# Patient Record
Sex: FEMALE | Race: BLACK OR AFRICAN AMERICAN | NOT HISPANIC OR LATINO
[De-identification: names, ages, dates, MRNs, and addresses within clinical notes are randomized per-mention and may not be internally consistent; named-entity substitution may affect disease eponyms.]

---

## 2020-03-13 PROBLEM — Z00.00 ENCOUNTER FOR PREVENTIVE HEALTH EXAMINATION: Status: ACTIVE | Noted: 2020-03-13

## 2020-04-16 ENCOUNTER — APPOINTMENT (OUTPATIENT)
Dept: ORTHOPEDIC SURGERY | Facility: CLINIC | Age: 50
End: 2020-04-16

## 2020-07-29 ENCOUNTER — APPOINTMENT (OUTPATIENT)
Dept: ORTHOPEDIC SURGERY | Facility: CLINIC | Age: 50
End: 2020-07-29
Payer: COMMERCIAL

## 2020-07-29 VITALS — BODY MASS INDEX: 30.62 KG/M2 | WEIGHT: 202 LBS | HEIGHT: 68 IN

## 2020-07-29 VITALS — TEMPERATURE: 97.4 F

## 2020-07-29 DIAGNOSIS — Z87.39 PERSONAL HISTORY OF OTHER DISEASES OF THE MUSCULOSKELETAL SYSTEM AND CONNECTIVE TISSUE: ICD-10-CM

## 2020-07-29 PROCEDURE — 73562 X-RAY EXAM OF KNEE 3: CPT | Mod: 50

## 2020-07-29 PROCEDURE — 99203 OFFICE O/P NEW LOW 30 MIN: CPT | Mod: 25

## 2020-07-29 PROCEDURE — 20611 DRAIN/INJ JOINT/BURSA W/US: CPT | Mod: 50

## 2020-07-29 RX ORDER — DICLOFENAC SODIUM 75 MG/1
75 TABLET, DELAYED RELEASE ORAL TWICE DAILY
Qty: 60 | Refills: 4 | Status: ACTIVE | COMMUNITY
Start: 2020-07-29 | End: 1900-01-01

## 2020-07-29 NOTE — HISTORY OF PRESENT ILLNESS
[de-identified] : BILATERAL KNEE PAIN\par RIGHT WORSE THEN LEFT PAIN WISE, LEFT HAS LEFT ROM\par LEFT KNEE ARTHROSCOPY OVER 35 YEARS AGO\par PAIN LEVEL 5/10\par INTERMITTENT PAIN\par DULL AND ACHY\par BETTER WITH REST, MEDICATION- GEL INJECTIONS \par WORSE WITH BENDING, STAIRS, WALKING\par STIFFNESS\par SWELLING\par

## 2020-07-29 NOTE — PHYSICAL EXAM
[de-identified] : PHYSICAL EXAM BILATERAL KNEES\par \par AROM\par RIGHT  0- 120\par LEFT    0-110\par \par SPECIAL TESTS\par \par PATELLAR GRIND = GRIND \par DRAWER  = NEG\par LACHMAN = NEG\par MACMURRAY = NEG \par \par MOTOR = GROSSLY INTACT\par SENSORY = GROSSLY INTACT \par \par \par  [de-identified] : XRAY LEFT KNEE:\par 4 views of the knee\par SEVERE PF , MILD-MOD TF OA\par No obvious fracture or dislocation. \par Alignment within normal limits\par \par \par XRAY RIGHT KNEE:\par 4 views of the knee\par MOD PF , MILD-MOD TF OA\par No obvious fracture or dislocation. \par Alignment within normal limits\par \par \par

## 2020-07-29 NOTE — PROCEDURE
[de-identified] : INJECTION / ASPIRATION BILATERAL KNEES\par \par Patient has demonstrated limited relief from NSAIDS, rest, exercises / PT , and after discussion of the risks and benefits, the patient has elected to proceed with a\par n ULTRASOUND GUIDED corticosteroid injection into the BILATERAL SupeLat PF Knee\par  \par Confirmed that the patient does not have history of prior adverse reactions, active, infections, or relevant allergies. There was no effusion, erythema, or warmth, and the skin was clear\par \par The skin was sterilized with alcohol. Ethyl Chloride was used as a topical anesthetic. Routine sterile technique. \par  \par The KNEE JOINT  was injected utilizing ULTRASOUND GUIDANCEwith KENALOG + LIDOCAINE. The injection was completed without complication and a bandage was applied.\par \par The patient tolerated the procedure well and was given post-injection instructions.Rec: Cold therapy, analgesics, avoid heavy activity.\par \par MEDICATION: Lidocaine 1% 4cc + 10mg of Kenalog\par

## 2020-07-29 NOTE — DISCUSSION/SUMMARY
[de-identified] : POST INJECTION INSTRUCTIONS\par \par COLD THERAPY , ANALGESICS PRN\par \par HOME STRETCHING AND EXERCISES QD\par \par CONSIDER VISCOSUPPLEMENT \par

## 2021-03-12 ENCOUNTER — APPOINTMENT (OUTPATIENT)
Dept: ORTHOPEDIC SURGERY | Facility: CLINIC | Age: 51
End: 2021-03-12
Payer: MEDICAID

## 2021-03-12 PROCEDURE — 20610 DRAIN/INJ JOINT/BURSA W/O US: CPT | Mod: 50

## 2021-03-12 PROCEDURE — 99214 OFFICE O/P EST MOD 30 MIN: CPT | Mod: 25

## 2021-03-12 PROCEDURE — 99072 ADDL SUPL MATRL&STAF TM PHE: CPT

## 2021-03-12 RX ORDER — HYALURONATE SODIUM, STABILIZED 88 MG/4 ML
88 SYRINGE (ML) INTRAARTICULAR
Qty: 4 | Refills: 0 | Status: ACTIVE | OUTPATIENT
Start: 2021-03-12

## 2021-03-12 NOTE — PHYSICAL EXAM
[de-identified] : General: No acute distress, conversant, well-nourished.\par Head: Normocephalic, atraumatic\par Neck: trachea midline, FROM\par Heart: normotensive and normal rate and rhythm\par Lungs: No labored breathing\par Skin: No abrasions, no rashes, no edema\par Psych: Alert and oriented to person, place and time\par Extremities: no peripheral edema or digital cyanosis\par Gait: Normal gait. Can perform tandem gait.  \par Vascular: warm and well perfused distally, palpable distal pulses\par \par MSK\par Right Knee with no erythema, no effusion.  \par + tenderness to palpation of knee.\par \par Range of motion: 0 - 130 degrees\par + pain with range of motion.\par \par Negative Chester's test.\par Stable to varus and valgus stress.\par Negative Lachman's test, negative anterior and posterior drawer tests.  \par \par Sensation intact to light touch.  \par Normal motor exam.\par Warm and well perfused distally.\par \par Left Knee with no erythema, no effusion.  \par + tenderness to palpation of knee.\par \par Range of motion: 0 - 130 degrees\par + pain with range of motion.\par \par Negative Chester's test.\par Stable to varus and valgus stress.\par Negative Lachman's test, negative anterior and posterior drawer tests.  \par \par Sensation intact to light touch.  \par Normal motor exam.\par Warm and well perfused distally. [de-identified] : Bilateral knee xrays shows age related osteoarthritis.

## 2021-03-12 NOTE — PHYSICAL EXAM
[de-identified] : General: No acute distress, conversant, well-nourished.\par Head: Normocephalic, atraumatic\par Neck: trachea midline, FROM\par Heart: normotensive and normal rate and rhythm\par Lungs: No labored breathing\par Skin: No abrasions, no rashes, no edema\par Psych: Alert and oriented to person, place and time\par Extremities: no peripheral edema or digital cyanosis\par Gait: Normal gait. Can perform tandem gait.  \par Vascular: warm and well perfused distally, palpable distal pulses\par \par MSK\par Right Knee with no erythema, no effusion.  \par + tenderness to palpation of knee.\par \par Range of motion: 0 - 130 degrees\par + pain with range of motion.\par \par Negative Chester's test.\par Stable to varus and valgus stress.\par Negative Lachman's test, negative anterior and posterior drawer tests.  \par \par Sensation intact to light touch.  \par Normal motor exam.\par Warm and well perfused distally.\par \par Left Knee with no erythema, no effusion.  \par + tenderness to palpation of knee.\par \par Range of motion: 0 - 130 degrees\par + pain with range of motion.\par \par Negative Chester's test.\par Stable to varus and valgus stress.\par Negative Lachman's test, negative anterior and posterior drawer tests.  \par \par Sensation intact to light touch.  \par Normal motor exam.\par Warm and well perfused distally. [de-identified] : Bilateral knee xrays shows age related osteoarthritis.

## 2021-03-12 NOTE — PROCEDURE
[de-identified] : Procedure: Left Knee Joint injection with corticosteroid\par Pre-Procedure Diagnosis: Left knee pain secondary to osteoarthritis\par Post-Procedure Diagnosis: same\par \par The patient was educated about the risks and benefits of a corticosteroid injection.  Alternatives were discussed.  The patient understood and consented for the procedure.\par \par The area was sterilely prepped using isopropyl alcohol.  An ethyl chloride spray provided  local anesthesia.  Using the usual sterile technique, 1 ml of 40mg/1ml of Kenalog and 4 ml of Lidocaine 1% without epinephrine was injected into the joint.  A dressing was applied to the area.  The patient tolerated the procedure well and without complication.\par \par Procedure: Right knee Joint injection with corticosteroid\par Pre-Procedure Diagnosis: Right knee pain secondary to osteoarthritis\par Post-Procedure Diagnosis: same\par \par The patient was educated about the risks and benefits of a corticosteroid injection.  Alternatives were discussed.  The patient understood and consented for the procedure.\par \par The area was sterilely prepped using isopropyl alcohol.  An ethyl chloride spray provided  local anesthesia.  Using the usual sterile technique, 1 ml of 40mg/1ml of Kenalog and 4 ml of Lidocaine 1% without epinephrine was injected into the joint.  A dressing was applied to the area.  The patient tolerated the procedure well and without complication.\par

## 2021-03-12 NOTE — HISTORY OF PRESENT ILLNESS
[de-identified] : 50 year old female with bilateral knee pain secondary to arthritis.  She previously had corticosteroid injection with Dr. Webber (last injection July 2020).  She had over 6 months of relief with the injection before her symptoms returned.  She is also interested in viscosupplementation which she has had in the past with success.

## 2021-03-12 NOTE — PROCEDURE
[de-identified] : Procedure: Left Knee Joint injection with corticosteroid\par Pre-Procedure Diagnosis: Left knee pain secondary to osteoarthritis\par Post-Procedure Diagnosis: same\par \par The patient was educated about the risks and benefits of a corticosteroid injection.  Alternatives were discussed.  The patient understood and consented for the procedure.\par \par The area was sterilely prepped using isopropyl alcohol.  An ethyl chloride spray provided  local anesthesia.  Using the usual sterile technique, 1 ml of 40mg/1ml of Kenalog and 4 ml of Lidocaine 1% without epinephrine was injected into the joint.  A dressing was applied to the area.  The patient tolerated the procedure well and without complication.\par \par Procedure: Right knee Joint injection with corticosteroid\par Pre-Procedure Diagnosis: Right knee pain secondary to osteoarthritis\par Post-Procedure Diagnosis: same\par \par The patient was educated about the risks and benefits of a corticosteroid injection.  Alternatives were discussed.  The patient understood and consented for the procedure.\par \par The area was sterilely prepped using isopropyl alcohol.  An ethyl chloride spray provided  local anesthesia.  Using the usual sterile technique, 1 ml of 40mg/1ml of Kenalog and 4 ml of Lidocaine 1% without epinephrine was injected into the joint.  A dressing was applied to the area.  The patient tolerated the procedure well and without complication.\par

## 2021-03-12 NOTE — ASSESSMENT
[FreeTextEntry1] : 50 year old female with bilateral knee pain secondary to osteoarthritis. She has had corticosteroid injections in the past with good relief of her symptoms. Her last was in July 2019. She would like new injections. We discussed risk and benefits of steroid injection.  Risks including for Covid-19 and recommendation to not get vaccine within 2 weeks of steroid dose.  She understands and consents. She was given bilateral knee corticosteroid injections.  We also discussed HA injections. We will order her bilateral knee HA injections.  She will followup when the HA injections are available.  She can continue OTC pain medications as needed.  She knows to call with any questions or concerns or if her symptoms acutely worsen.

## 2021-03-12 NOTE — HISTORY OF PRESENT ILLNESS
[de-identified] : 50 year old female with bilateral knee pain secondary to arthritis.  She previously had corticosteroid injection with Dr. Webber (last injection July 2020).  She had over 6 months of relief with the injection before her symptoms returned.  She is also interested in viscosupplementation which she has had in the past with success.

## 2021-03-30 ENCOUNTER — TRANSCRIPTION ENCOUNTER (OUTPATIENT)
Age: 51
End: 2021-03-30

## 2021-05-19 ENCOUNTER — APPOINTMENT (OUTPATIENT)
Dept: ORTHOPEDIC SURGERY | Facility: CLINIC | Age: 51
End: 2021-05-19
Payer: MEDICAID

## 2021-05-19 DIAGNOSIS — M25.561 PAIN IN RIGHT KNEE: ICD-10-CM

## 2021-05-19 DIAGNOSIS — M25.562 PAIN IN RIGHT KNEE: ICD-10-CM

## 2021-05-19 PROCEDURE — 99213 OFFICE O/P EST LOW 20 MIN: CPT | Mod: 25

## 2021-05-19 PROCEDURE — 20610 DRAIN/INJ JOINT/BURSA W/O US: CPT | Mod: 50

## 2021-05-19 PROCEDURE — 99072 ADDL SUPL MATRL&STAF TM PHE: CPT

## 2021-05-19 NOTE — HISTORY OF PRESENT ILLNESS
[de-identified] : 50 year old female with acute exacerbation of chronic bilateral knee pain secondary to arthritis. She had relief from her corticosteroid injections last visit.  She is here for her bilateral monovisc injections.

## 2021-05-19 NOTE — PROCEDURE
[de-identified] : Procedure: Left Knee Joint injection with Monovisc\par Pre-Procedure Diagnosis: Left knee pain secondary to osteoarthritis\par Post-Procedure Diagnosis: same\par \par The patient was educated about the risks and benefits of a Monovisc injection.  Alternatives were discussed.  The patient understood and consented for the procedure.\par \par The area was sterilely prepped using isopropyl alcohol.  An ethyl chloride spray provided  local anesthesia.  Using the usual sterile technique, Monovisc was injected into the joint.  A dressing was applied to the area.  The patient tolerated the procedure well and without complication.\par \par Procedure: Right knee Joint injection with Monovisc\par Pre-Procedure Diagnosis: Right knee pain secondary to osteoarthritis\par Post-Procedure Diagnosis: same\par \par The patient was educated about the risks and benefits of a Monovisc injection.  Alternatives were discussed.  The patient understood and consented for the procedure.\par \par The area was sterilely prepped using isopropyl alcohol.  An ethyl chloride spray provided  local anesthesia.  Using the usual sterile technique, Monovisc was injected into the joint.  A dressing was applied to the area.  The patient tolerated the procedure well and without complication.\par

## 2021-05-19 NOTE — ASSESSMENT
[FreeTextEntry1] : 50 year old female with bilateral knee pain secondary to osteoarthritis. Since her last appointment she had some  relief with her steroid injections.  She is here for her Monovisc injections.  She was given her bilateral knee Monovisc injections which she tolerated well.  She will followup in 3 months.  She can continue OTC pain medications as needed.  She knows to call with any questions or concerns or if her symptoms acutely worsen.

## 2022-10-20 ENCOUNTER — NON-APPOINTMENT (OUTPATIENT)
Age: 52
End: 2022-10-20

## 2022-10-20 ENCOUNTER — APPOINTMENT (OUTPATIENT)
Dept: ORTHOPEDIC SURGERY | Facility: CLINIC | Age: 52
End: 2022-10-20

## 2022-10-20 DIAGNOSIS — M17.11 UNILATERAL PRIMARY OSTEOARTHRITIS, RIGHT KNEE: ICD-10-CM

## 2022-10-20 DIAGNOSIS — M17.12 UNILATERAL PRIMARY OSTEOARTHRITIS, LEFT KNEE: ICD-10-CM

## 2022-10-20 PROCEDURE — 99213 OFFICE O/P EST LOW 20 MIN: CPT | Mod: 25

## 2022-10-20 PROCEDURE — 20611 DRAIN/INJ JOINT/BURSA W/US: CPT | Mod: 50

## 2022-10-20 RX ORDER — HYALURONATE SODIUM, STABILIZED 88 MG/4 ML
88 SYRINGE (ML) INTRAARTICULAR
Qty: 2 | Refills: 0 | Status: ACTIVE | OUTPATIENT
Start: 2022-10-20

## 2022-10-20 NOTE — PHYSICAL EXAM
[de-identified] : PHYSICAL EXAM BILATERAL KNEES\par \par AROM\par RIGHT  0- 120\par LEFT    0-110\par \par SPECIAL TESTS\par \par PATELLAR GRIND = GRIND \par DRAWER  = NEG\par LACHMAN = NEG\par MACMURRAY = NEG \par \par MOTOR = GROSSLY INTACT\par SENSORY = GROSSLY INTACT \par \par \par

## 2022-10-20 NOTE — PROCEDURE
[de-identified] : INJECTION / ASPIRATION BILATERAL KNEES\par \par Patient has demonstrated limited relief from NSAIDS, rest, exercises / PT , and after discussion of the risks and benefits, the patient has elected to proceed with a\par n ULTRASOUND GUIDED corticosteroid injection into the BILATERAL SupeLat PF Knee\par  \par Confirmed that the patient does not have history of prior adverse reactions, active, infections, or relevant allergies. There was no effusion, erythema, or warmth, and the skin was clear\par \par The skin was sterilized with alcohol. Ethyl Chloride was used as a topical anesthetic. Routine sterile technique. \par  \par The KNEE JOINT was injected utilizing ULTRASOUND GUIDANCEwith KENALOG + LIDOCAINE. The injection was completed without complication and a bandage was applied.\par \par The patient tolerated the procedure well and was given post-injection instructions.Rec: Cold therapy, analgesics, avoid heavy activity.\par \par MEDICATION: Lidocaine 1% 4cc + 10mg of Kenalog \par \par lot:[id number] exp:10/2023

## 2022-10-20 NOTE — DISCUSSION/SUMMARY
[de-identified] : XRAYS REVEAL KNEE OSTEOARTHRITIS\par LITTLE RELIEF FROM NSAIDS , EXERCISES \par PATIENT HAS ELECTED TO PROCEED WITH KENALOG INJECTION KNEE  \par RISKS AND BENEFITS DISCUSSED - VERBAL CONSENT OBTAINED \par SEE PROCEDURE NOTE\par \par \par POST INJECTION INSTRUCTIONS:\par \par INJECTION THERAPY HANDOUT PROVIDED\par \par COLD THERAPY , ANALGESICS PRN\par \par HOME STRETCHING AND EXERCISES QD  -  HANDOUT PROVIDED, REVIEWED AND DEMONSTRATED \par \par MONOVISC  ordered\par \par \par

## 2022-10-20 NOTE — HISTORY OF PRESENT ILLNESS
[de-identified] : BILATERAL KNEE PAIN \par FLARED UP 2021 \par RIGHT KNEE WORSE THAN LEFT \par Intermittent \par CLICKING  WHEN WALKING \par PAIN LEVEL: 4/10 \par JULY 29, 2020- CORTIOSN INJECTION-HELPFUL \par MAY 19, 2021- MONOVISC INJECTION ADMINISTERED BY DR. MOYER- HELPFUL \par PT IS HERE TO ORDER GEL INJECTION AND CORTISONE INJECTION \par \par \par \par \par PREVIOUS HPI\par BILATERAL KNEE PAIN\par RIGHT WORSE THEN LEFT PAIN WISE, LEFT HAS LEFT ROM\par LEFT KNEE ARTHROSCOPY OVER 35 YEARS AGO\par PAIN LEVEL 5/10\par INTERMITTENT PAIN\par DULL AND ACHY\par BETTER WITH REST, MEDICATION- GEL INJECTIONS \par WORSE WITH BENDING, STAIRS, WALKING\par STIFFNESS\par SWELLING\par \par \par

## 2023-09-13 RX ORDER — HYALURONATE SODIUM, STABILIZED 88 MG/4 ML
88 SYRINGE (ML) INTRAARTICULAR
Qty: 2 | Refills: 0 | Status: ACTIVE | OUTPATIENT
Start: 2023-09-07

## 2023-10-02 ENCOUNTER — APPOINTMENT (OUTPATIENT)
Dept: ORTHOPEDIC SURGERY | Facility: CLINIC | Age: 53
End: 2023-10-02
Payer: COMMERCIAL

## 2023-10-02 DIAGNOSIS — M17.0 BILATERAL PRIMARY OSTEOARTHRITIS OF KNEE: ICD-10-CM

## 2023-10-02 PROCEDURE — 99213 OFFICE O/P EST LOW 20 MIN: CPT | Mod: 25

## 2023-10-02 PROCEDURE — 20611 DRAIN/INJ JOINT/BURSA W/US: CPT | Mod: 50

## 2024-06-13 RX ORDER — HYALURONATE SODIUM, STABILIZED 88 MG/4 ML
88 SYRINGE (ML) INTRAARTICULAR
Qty: 2 | Refills: 0 | Status: ACTIVE | OUTPATIENT
Start: 2024-06-13

## 2024-07-08 RX ORDER — HYALURONATE SODIUM, STABILIZED 88 MG/4 ML
88 SYRINGE (ML) INTRAARTICULAR
Qty: 2 | Refills: 0 | Status: ACTIVE | OUTPATIENT
Start: 2024-07-08

## 2024-07-09 ENCOUNTER — APPOINTMENT (OUTPATIENT)
Dept: HEART AND VASCULAR | Facility: CLINIC | Age: 54
End: 2024-07-09
Payer: COMMERCIAL

## 2024-07-09 ENCOUNTER — NON-APPOINTMENT (OUTPATIENT)
Age: 54
End: 2024-07-09

## 2024-07-09 VITALS
OXYGEN SATURATION: 99 % | HEIGHT: 68 IN | DIASTOLIC BLOOD PRESSURE: 67 MMHG | BODY MASS INDEX: 28.04 KG/M2 | SYSTOLIC BLOOD PRESSURE: 102 MMHG | HEART RATE: 93 BPM | WEIGHT: 185 LBS

## 2024-07-09 DIAGNOSIS — R94.31 ABNORMAL ELECTROCARDIOGRAM [ECG] [EKG]: ICD-10-CM

## 2024-07-09 DIAGNOSIS — E78.5 HYPERLIPIDEMIA, UNSPECIFIED: ICD-10-CM

## 2024-07-09 DIAGNOSIS — R06.83 SNORING: ICD-10-CM

## 2024-07-09 DIAGNOSIS — I51.7 CARDIOMEGALY: ICD-10-CM

## 2024-07-09 PROCEDURE — 93000 ELECTROCARDIOGRAM COMPLETE: CPT

## 2024-07-09 PROCEDURE — G2211 COMPLEX E/M VISIT ADD ON: CPT

## 2024-07-09 PROCEDURE — 99204 OFFICE O/P NEW MOD 45 MIN: CPT

## 2024-07-09 RX ORDER — ATORVASTATIN CALCIUM 20 MG/1
20 TABLET, FILM COATED ORAL DAILY
Qty: 90 | Refills: 3 | Status: ACTIVE | COMMUNITY
Start: 2024-07-09 | End: 1900-01-01

## 2024-07-09 RX ORDER — TIRZEPATIDE 2.5 MG/.5ML
2.5 INJECTION, SOLUTION SUBCUTANEOUS
Refills: 0 | Status: ACTIVE | COMMUNITY

## 2024-07-19 ENCOUNTER — APPOINTMENT (OUTPATIENT)
Dept: SLEEP CENTER | Facility: HOME HEALTH | Age: 54
End: 2024-07-19
Payer: COMMERCIAL

## 2024-07-19 ENCOUNTER — OUTPATIENT (OUTPATIENT)
Dept: OUTPATIENT SERVICES | Facility: HOSPITAL | Age: 54
LOS: 1 days | End: 2024-07-19

## 2024-07-19 DIAGNOSIS — G47.33 OBSTRUCTIVE SLEEP APNEA (ADULT) (PEDIATRIC): ICD-10-CM

## 2024-07-19 PROCEDURE — 95800 SLP STDY UNATTENDED: CPT

## 2024-07-19 PROCEDURE — 95800 SLP STDY UNATTENDED: CPT | Mod: 26

## 2024-07-25 ENCOUNTER — TRANSCRIPTION ENCOUNTER (OUTPATIENT)
Age: 54
End: 2024-07-25

## 2024-08-08 ENCOUNTER — APPOINTMENT (OUTPATIENT)
Dept: ORTHOPEDIC SURGERY | Facility: CLINIC | Age: 54
End: 2024-08-08

## 2024-08-08 PROBLEM — M54.16 LUMBAR RADICULOPATHY, ACUTE: Status: ACTIVE | Noted: 2024-08-08

## 2024-08-08 PROCEDURE — 20611 DRAIN/INJ JOINT/BURSA W/US: CPT

## 2024-08-08 PROCEDURE — 99214 OFFICE O/P EST MOD 30 MIN: CPT | Mod: 25

## 2024-08-08 NOTE — PHYSICAL EXAM
[de-identified] : PHYSICAL EXAM BILATERAL KNEES  AROM RIGHT  0- 120 LEFT    0-110  SPECIAL TESTS  PATELLAR GRIND = GRIND  DRAWER  = NEG LACHMAN = NEG MACMURRAY = NEG   MOTOR = GROSSLY INTACT SENSORY = GROSSLY INTACT

## 2024-08-08 NOTE — PROCEDURE
Pt. Presents to ED with complaints of LUQ abdominal pain/L chest pain accompanied by dizziness and jaw pain. Pt. Reports this started about 3.5 hours ago. Hypertensive, OVSS on RA. Pt. Reports a history of costochondritis.    [de-identified] : Patient has demonstrated limited relief from NSAIDS, rest, exercises / PT , and after discussion of the risks and benefits, the patient has elected to proceed with a n ULTRASOUND GUIDED VISCOSUPPLEMENT injection into the BILATERAL SupeLat PF Knee   Confirmed that the patient does not have history of prior adverse reactions, active, infections, or relevant allergies. There was no effusion, erythema, or warmth, and the skin was clear  The skin was sterilized with alcohol. Ethyl Chloride was used as a topical anesthetic. Routine sterile technique.    The KNEE JOINT  was injected utilizing ULTRASOUND GUIDANCE with MONOVISC 4CC. The injection was completed without complication and a bandage was applied.  The patient tolerated the procedure well and was given post-injection instructions.Rec: Cold therapy, analgesics, avoid heavy activity.  MEDICATION: MONOVISC 4CC  EFFUSION ASPIRATED  :NONE

## 2024-08-23 ENCOUNTER — APPOINTMENT (OUTPATIENT)
Dept: HEART AND VASCULAR | Facility: CLINIC | Age: 54
End: 2024-08-23
Payer: COMMERCIAL

## 2024-08-23 PROCEDURE — 36415 COLL VENOUS BLD VENIPUNCTURE: CPT

## 2024-08-23 PROCEDURE — 93306 TTE W/DOPPLER COMPLETE: CPT

## 2024-08-26 LAB
CHOLEST SERPL-MCNC: 151 MG/DL
HDLC SERPL-MCNC: 56 MG/DL
LDLC SERPL CALC-MCNC: 82 MG/DL
NONHDLC SERPL-MCNC: 95 MG/DL
TRIGL SERPL-MCNC: 64 MG/DL

## 2024-08-29 ENCOUNTER — APPOINTMENT (OUTPATIENT)
Dept: HEART AND VASCULAR | Facility: CLINIC | Age: 54
End: 2024-08-29
Payer: SELF-PAY

## 2024-08-29 DIAGNOSIS — E66.3 OVERWEIGHT: ICD-10-CM

## 2024-08-29 DIAGNOSIS — G47.33 OBSTRUCTIVE SLEEP APNEA (ADULT) (PEDIATRIC): ICD-10-CM

## 2024-08-29 DIAGNOSIS — E78.5 HYPERLIPIDEMIA, UNSPECIFIED: ICD-10-CM

## 2024-08-29 DIAGNOSIS — E66.9 OVERWEIGHT: ICD-10-CM

## 2024-08-29 PROCEDURE — 99214 OFFICE O/P EST MOD 30 MIN: CPT

## 2024-08-29 NOTE — DISCUSSION/SUMMARY
[FreeTextEntry1] : 54F PMH HLD overweight/obesity and mild TITA presents today for follow up.  #HLD #ASCVD Risk reduction LDL improved to 82  - c/w atorvastatin 20mg daily  - encouraged patient to continue healthy exercise and eating habits, focusing on a mostly plant based diet, Mediterranean style of eating and aiming for the recommended 150 minutes per week of moderate physical activity   #Left atrial enlargement Present on EKG - TTE 8/23/24: normal RV and LV size and function; LVEF 65%; no significant valvular disease  #Severe Snoring Improved after weight loss. No daytime somnolence  - sleep study revealed mild TITA - advised continued weight loss, positional changes while sleeping; dental device from dentis; can also refer to Dr. Chan if pt likes   Overweight/obesity: BMI was 30 at highest weight of 200 lbs; now down to 185 - advised she have GYN appeal the denial or see if Wegovy would be covered; if not, told her about coupon code for Zepbound   Pt has f/u in Jan 2025.

## 2024-08-29 NOTE — REASON FOR VISIT
[CV Risk Factors and Non-Cardiac Disease] : CV risk factors and non-cardiac disease [Hyperlipidemia] : hyperlipidemia [FreeTextEntry3] : Dr. Jacques Moritz

## 2024-08-29 NOTE — HISTORY OF PRESENT ILLNESS
[Home] : at home, [unfilled] , at the time of the visit. [Other Location: e.g. Home (Enter Location, City,State)___] : at [unfilled] [Verbal consent obtained from patient] : the patient, [unfilled] [FreeTextEntry1] : 54F PMH HLD overweight/obesity and mild TITA presents today for follow up.  She reports compliance w/ Lipitor 20mg and tolerating well. Happy to see improvement in repeat labs this month.  She was started on Zepbound by GYN, but insurance denied after her first month. She would like to restart it.  Patient denies any chest pain, SOB, palpitations, orthopnea, PND or LE edema.  ================================================ Feels well, no complaints. Started zepbound last month, tolerating well. She took atorvastatin 20mg in June but ran out. Generally eats well. Primary exercise is walking on weekends. Used to snore but improved after 10lbs weight loss in a month. No chest pain, SOB, LEE.   CARDIOMETABOLIC RISK FACTORS: Family History: Mother -diabetes, HTN, HLD Father - not significant  Brother - HLD  Lifestyle History:   -Mediterranean Diet Score (9 question survey) was 4.       (8-9: optimal, 6-7: near-optimal, 4-5: suboptimal, 0-3: markedly suboptimal)   -Self-described diet: tries to eat everything in moderate, lean meats, fruits, some white rice   -Exercise: YES / NO, moderate level for 120 minutes per week.   -Smoking: never   -EtOH: 1 drink a week   -Stress: Patient denies any stress.   -Sleep apnea: snoring+, no daytime somnolence  Do you have polycystic ovarian syndrome? N Have you ever been pregnant? Y If so, how many times? 2 Did you have any complications during pregnancy? N Did you have any postpartum complications? Have you had any miscarriages? If so, how many? Have you gone through menopause? If yes, at what age? Did you receive hormone replacement? N ==========  7/9/24: EKG normal sinus rhythm left atrial enlargement Records from Dr. Daly  Labs 07/14/24 Tchol 241, HDL 55, ,  BUN/Cr 11/0.82, eGFR 82 AST/ALT 17/20 a1c 5.8%  09/13/2023 Tchol 267 HDL 59 TG 94  a1c 5.7%

## 2024-12-11 ENCOUNTER — APPOINTMENT (OUTPATIENT)
Dept: ORTHOPEDIC SURGERY | Facility: CLINIC | Age: 54
End: 2024-12-11
Payer: COMMERCIAL

## 2024-12-11 DIAGNOSIS — M54.16 RADICULOPATHY, LUMBAR REGION: ICD-10-CM

## 2024-12-11 PROCEDURE — 72110 X-RAY EXAM L-2 SPINE 4/>VWS: CPT

## 2024-12-11 PROCEDURE — 99214 OFFICE O/P EST MOD 30 MIN: CPT

## 2025-01-06 ENCOUNTER — APPOINTMENT (OUTPATIENT)
Dept: HEART AND VASCULAR | Facility: CLINIC | Age: 55
End: 2025-01-06
Payer: COMMERCIAL

## 2025-01-06 ENCOUNTER — NON-APPOINTMENT (OUTPATIENT)
Age: 55
End: 2025-01-06

## 2025-01-06 VITALS
TEMPERATURE: 97.5 F | BODY MASS INDEX: 27.89 KG/M2 | HEIGHT: 68 IN | OXYGEN SATURATION: 98 % | SYSTOLIC BLOOD PRESSURE: 125 MMHG | HEART RATE: 68 BPM | DIASTOLIC BLOOD PRESSURE: 73 MMHG | WEIGHT: 184 LBS

## 2025-01-06 DIAGNOSIS — E78.5 HYPERLIPIDEMIA, UNSPECIFIED: ICD-10-CM

## 2025-01-06 PROCEDURE — 93000 ELECTROCARDIOGRAM COMPLETE: CPT

## 2025-01-06 PROCEDURE — 99214 OFFICE O/P EST MOD 30 MIN: CPT | Mod: 25
